# Patient Record
Sex: FEMALE | Race: WHITE | NOT HISPANIC OR LATINO
[De-identification: names, ages, dates, MRNs, and addresses within clinical notes are randomized per-mention and may not be internally consistent; named-entity substitution may affect disease eponyms.]

---

## 2017-02-06 ENCOUNTER — APPOINTMENT (OUTPATIENT)
Age: 57
End: 2017-02-06

## 2017-04-04 LAB
BASOPHILS # BLD AUTO: 0.03 K/UL
BASOPHILS NFR BLD AUTO: 0.5 %
EOSINOPHIL # BLD AUTO: 0.26 K/UL
EOSINOPHIL NFR BLD AUTO: 4.1 %
HCT VFR BLD CALC: 47.6 %
HGB BLD-MCNC: 15.8 G/DL
IMM GRANULOCYTES NFR BLD AUTO: 0.2 %
LYMPHOCYTES # BLD AUTO: 2.36 K/UL
LYMPHOCYTES NFR BLD AUTO: 37.2 %
MAN DIFF?: NORMAL
MCHC RBC-ENTMCNC: 33.2 GM/DL
MCHC RBC-ENTMCNC: 33.9 PG
MCV RBC AUTO: 102.1 FL
MONOCYTES # BLD AUTO: 0.63 K/UL
MONOCYTES NFR BLD AUTO: 9.9 %
NEUTROPHILS # BLD AUTO: 3.05 K/UL
NEUTROPHILS NFR BLD AUTO: 48.1 %
PLATELET # BLD AUTO: 135 K/UL
RBC # BLD: 4.66 M/UL
RBC # FLD: 13.2 %
WBC # FLD AUTO: 6.34 K/UL

## 2017-04-05 LAB
HCV RNA SERPL NAA DL=5-ACNC: ABNORMAL IU/ML
HCV RNA SERPL NAA+PROBE-LOG IU: 5.82 LOGIU/ML

## 2017-05-15 ENCOUNTER — EMERGENCY (EMERGENCY)
Facility: HOSPITAL | Age: 57
LOS: 1 days | Discharge: HOME | End: 2017-05-15
Attending: EMERGENCY MEDICINE | Admitting: EMERGENCY MEDICINE
Payer: MEDICAID

## 2017-05-15 VITALS
WEIGHT: 139.99 LBS | RESPIRATION RATE: 15 BRPM | DIASTOLIC BLOOD PRESSURE: 84 MMHG | TEMPERATURE: 99 F | OXYGEN SATURATION: 97 % | HEIGHT: 67 IN | HEART RATE: 78 BPM | SYSTOLIC BLOOD PRESSURE: 127 MMHG

## 2017-05-15 DIAGNOSIS — F17.210 NICOTINE DEPENDENCE, CIGARETTES, UNCOMPLICATED: ICD-10-CM
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
## 2017-05-15 DIAGNOSIS — S80.812A ABRASION, LEFT LOWER LEG, INITIAL ENCOUNTER: ICD-10-CM

## 2017-05-15 DIAGNOSIS — Z88.2 ALLERGY STATUS TO SULFONAMIDES: ICD-10-CM
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
## 2017-05-15 DIAGNOSIS — S09.92XA UNSPECIFIED INJURY OF NOSE, INITIAL ENCOUNTER: ICD-10-CM

## 2017-05-15 DIAGNOSIS — S00.33XA CONTUSION OF NOSE, INITIAL ENCOUNTER: ICD-10-CM

## 2017-05-15 PROCEDURE — 70160 X-RAY EXAM OF NASAL BONES: CPT

## 2017-05-15 PROCEDURE — 99283 EMERGENCY DEPT VISIT LOW MDM: CPT

## 2017-05-15 PROCEDURE — 99283 EMERGENCY DEPT VISIT LOW MDM: CPT | Mod: 25

## 2017-05-15 PROCEDURE — 70160 X-RAY EXAM OF NASAL BONES: CPT | Mod: 26

## 2017-05-15 NOTE — ED PROVIDER NOTE - ATTENDING CONTRIBUTION TO CARE
Pt is a 57 yo female who presents to the ED with a cc of nasal bone pain and possible fracture.  Pt reports that yesterday she was running to cross the street.  She did not see a low lying chain, tripped and fell striking her right hip and left side of her face on the ground.  Denies LOC.  Pt is not on blood thinners.  Reports Tetanus is UTD.  Pt states that she suffered and abrasion to her left shin from the chain and a bruise to her right hip but has been ambulatory without issue.  She is concerned that she may have fracture her nose because it is swollen.  Denies epistaxis.  On exam pt resting comfortably in bed NAD.  PERRL, EOMI, swelling and contusion noted to left side of nose with abrasion to distal aspect.  No active bleeding or septal hematoma noted.  Contusion noted to right lateral hip.  Bones intact no acute deformity seen.   FROM of leg.  Abrasion noted to left shin no acute deformity noted.  Agree with above plan of care.

## 2017-05-15 NOTE — ED ADULT NURSE REASSESSMENT NOTE - NS ED NURSE REASSESS COMMENT FT1
pt has  8 cm abrasion to  left ankle no bleeding at site .  abrasion to rt knee and rt elbow no bleeding wound closed. .  there is a purple hematoma rt upper thigh.  pt ambulating with steady gait.  reviewed d/c instruction ices to nose follow up with PCP x 24hr.

## 2017-05-15 NOTE — ED PROVIDER NOTE - OBJECTIVE STATEMENT
trip and fall yesterday on street, struck her nose, denies loc, denies nosebleed. PMD Dr Feliz  tetanus utd  refused pain med

## 2017-07-11 ENCOUNTER — APPOINTMENT (OUTPATIENT)
Age: 57
End: 2017-07-11

## 2017-07-14 ENCOUNTER — APPOINTMENT (OUTPATIENT)
Age: 57
End: 2017-07-14

## 2017-07-14 VITALS
TEMPERATURE: 98.3 F | BODY MASS INDEX: 21.97 KG/M2 | HEIGHT: 67 IN | WEIGHT: 140 LBS | RESPIRATION RATE: 12 BRPM | HEART RATE: 82 BPM | SYSTOLIC BLOOD PRESSURE: 130 MMHG | DIASTOLIC BLOOD PRESSURE: 83 MMHG

## 2017-07-14 DIAGNOSIS — R10.11 RIGHT UPPER QUADRANT PAIN: ICD-10-CM

## 2017-07-14 LAB
AFP-TM SERPL-MCNC: 5.1 NG/ML
ALBUMIN SERPL ELPH-MCNC: 4.6 G/DL
ALP BLD-CCNC: 88 U/L
ALT SERPL-CCNC: 86 U/L
AMYLASE/CREAT SERPL: 61 U/L
ANION GAP SERPL CALC-SCNC: 13 MMOL/L
AST SERPL-CCNC: 99 U/L
BASOPHILS # BLD AUTO: 0.04 K/UL
BASOPHILS NFR BLD AUTO: 0.7 %
BILIRUB SERPL-MCNC: 0.4 MG/DL
BUN SERPL-MCNC: 8 MG/DL
CALCIUM SERPL-MCNC: 9.5 MG/DL
CHLORIDE SERPL-SCNC: 101 MMOL/L
CO2 SERPL-SCNC: 25 MMOL/L
CREAT SERPL-MCNC: 0.74 MG/DL
EOSINOPHIL # BLD AUTO: 0.19 K/UL
EOSINOPHIL NFR BLD AUTO: 3.1 %
HCT VFR BLD CALC: 46.8 %
HGB BLD-MCNC: 15.5 G/DL
IMM GRANULOCYTES NFR BLD AUTO: 0 %
LPL SERPL-CCNC: 85 U/L
LYMPHOCYTES # BLD AUTO: 1.72 K/UL
LYMPHOCYTES NFR BLD AUTO: 28.5 %
MAN DIFF?: NORMAL
MCHC RBC-ENTMCNC: 32.6 PG
MCHC RBC-ENTMCNC: 33.1 GM/DL
MCV RBC AUTO: 98.3 FL
MONOCYTES # BLD AUTO: 0.88 K/UL
MONOCYTES NFR BLD AUTO: 14.6 %
NEUTROPHILS # BLD AUTO: 3.21 K/UL
NEUTROPHILS NFR BLD AUTO: 53.1 %
PLATELET # BLD AUTO: 202 K/UL
POTASSIUM SERPL-SCNC: 4.9 MMOL/L
PROT SERPL-MCNC: 7.9 G/DL
RBC # BLD: 4.76 M/UL
RBC # FLD: 13.3 %
SODIUM SERPL-SCNC: 139 MMOL/L
WBC # FLD AUTO: 6.04 K/UL

## 2017-07-14 RX ORDER — ESTROGENS, CONJUGATED 0.62 MG/1
0.62 TABLET, FILM COATED ORAL DAILY
Refills: 0 | Status: ACTIVE | COMMUNITY
Start: 2017-07-14

## 2017-07-17 LAB
HCV RNA SERPL NAA DL=5-ACNC: ABNORMAL IU/ML
HCV RNA SERPL NAA+PROBE-LOG IU: 5.66 LOGIU/ML

## 2017-07-19 ENCOUNTER — APPOINTMENT (OUTPATIENT)
Age: 57
End: 2017-07-19

## 2017-07-19 DIAGNOSIS — B18.2 CHRONIC VIRAL HEPATITIS C: ICD-10-CM

## 2017-07-24 RX ORDER — LEDIPASVIR AND SOFOSBUVIR 90; 400 MG/1; MG/1
90-400 TABLET, FILM COATED ORAL
Qty: 28 | Refills: 2 | Status: DISCONTINUED | COMMUNITY
Start: 2017-07-24 | End: 2017-07-24

## 2017-07-28 RX ORDER — ELBASVIR AND GRAZOPREVIR 50; 100 MG/1; MG/1
50-100 TABLET, FILM COATED ORAL
Qty: 28 | Refills: 2 | Status: ACTIVE | COMMUNITY
Start: 2017-07-24

## 2017-09-25 ENCOUNTER — EMERGENCY (EMERGENCY)
Facility: HOSPITAL | Age: 57
LOS: 1 days | Discharge: ANOTHER HOSPITAL | End: 2017-09-25
Attending: EMERGENCY MEDICINE | Admitting: EMERGENCY MEDICINE
Payer: MEDICAID

## 2017-09-25 ENCOUNTER — INPATIENT (INPATIENT)
Facility: HOSPITAL | Age: 57
LOS: 1 days | Discharge: HOME | DRG: 184 | End: 2017-09-27
Attending: SURGERY | Admitting: SURGERY
Payer: MEDICAID

## 2017-09-25 VITALS — DIASTOLIC BLOOD PRESSURE: 74 MMHG | SYSTOLIC BLOOD PRESSURE: 124 MMHG | HEART RATE: 60 BPM

## 2017-09-25 VITALS
HEART RATE: 114 BPM | SYSTOLIC BLOOD PRESSURE: 151 MMHG | OXYGEN SATURATION: 97 % | TEMPERATURE: 99 F | DIASTOLIC BLOOD PRESSURE: 90 MMHG | WEIGHT: 139.99 LBS | RESPIRATION RATE: 20 BRPM | HEIGHT: 67 IN

## 2017-09-25 VITALS
TEMPERATURE: 98 F | HEART RATE: 65 BPM | OXYGEN SATURATION: 97 % | DIASTOLIC BLOOD PRESSURE: 86 MMHG | RESPIRATION RATE: 18 BRPM | SYSTOLIC BLOOD PRESSURE: 145 MMHG

## 2017-09-25 DIAGNOSIS — F17.200 NICOTINE DEPENDENCE, UNSPECIFIED, UNCOMPLICATED: ICD-10-CM

## 2017-09-25 DIAGNOSIS — Z90.710 ACQUIRED ABSENCE OF BOTH CERVIX AND UTERUS: Chronic | ICD-10-CM

## 2017-09-25 DIAGNOSIS — S22.39XA FRACTURE OF ONE RIB, UNSPECIFIED SIDE, INITIAL ENCOUNTER FOR CLOSED FRACTURE: ICD-10-CM

## 2017-09-25 DIAGNOSIS — Z88.2 ALLERGY STATUS TO SULFONAMIDES: ICD-10-CM

## 2017-09-25 DIAGNOSIS — S22.42XA MULTIPLE FRACTURES OF RIBS, LEFT SIDE, INITIAL ENCOUNTER FOR CLOSED FRACTURE: ICD-10-CM

## 2017-09-25 DIAGNOSIS — Z88.6 ALLERGY STATUS TO ANALGESIC AGENT: ICD-10-CM

## 2017-09-25 DIAGNOSIS — S27.0XXA TRAUMATIC PNEUMOTHORAX, INITIAL ENCOUNTER: ICD-10-CM
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
## 2017-09-25 LAB
ALBUMIN SERPL ELPH-MCNC: 3.9 G/DL — SIGNIFICANT CHANGE UP (ref 3.3–5)
ALBUMIN SERPL ELPH-MCNC: 4 G/DL — SIGNIFICANT CHANGE UP (ref 3.3–5)
ALP SERPL-CCNC: 55 U/L — SIGNIFICANT CHANGE UP (ref 40–120)
ALP SERPL-CCNC: 77 U/L — SIGNIFICANT CHANGE UP (ref 40–120)
ALT FLD-CCNC: 24 U/L RC — SIGNIFICANT CHANGE UP (ref 10–45)
ALT FLD-CCNC: 35 U/L — SIGNIFICANT CHANGE UP (ref 12–78)
ANION GAP SERPL CALC-SCNC: 10 MMOL/L — SIGNIFICANT CHANGE UP (ref 5–17)
ANION GAP SERPL CALC-SCNC: 12 MMOL/L — SIGNIFICANT CHANGE UP (ref 5–17)
APTT BLD: 27.3 SEC — LOW (ref 27.5–37.4)
APTT BLD: 29 SEC — SIGNIFICANT CHANGE UP (ref 27.5–37.4)
AST SERPL-CCNC: 34 U/L — SIGNIFICANT CHANGE UP (ref 10–40)
AST SERPL-CCNC: 40 U/L — HIGH (ref 15–37)
BASOPHILS # BLD AUTO: 0.2 K/UL — SIGNIFICANT CHANGE UP (ref 0–0.2)
BASOPHILS NFR BLD AUTO: 1.3 % — SIGNIFICANT CHANGE UP (ref 0–2)
BILIRUB DIRECT SERPL-MCNC: 0.2 MG/DL — SIGNIFICANT CHANGE UP (ref 0–0.2)
BILIRUB INDIRECT FLD-MCNC: 0.3 MG/DL — SIGNIFICANT CHANGE UP (ref 0.2–1)
BILIRUB SERPL-MCNC: 0.4 MG/DL — SIGNIFICANT CHANGE UP (ref 0.2–1.2)
BILIRUB SERPL-MCNC: 0.5 MG/DL — SIGNIFICANT CHANGE UP (ref 0.2–1.2)
BLD GP AB SCN SERPL QL: NEGATIVE — SIGNIFICANT CHANGE UP
BUN SERPL-MCNC: 7 MG/DL — SIGNIFICANT CHANGE UP (ref 7–23)
BUN SERPL-MCNC: 7 MG/DL — SIGNIFICANT CHANGE UP (ref 7–23)
CALCIUM SERPL-MCNC: 8.2 MG/DL — LOW (ref 8.4–10.5)
CALCIUM SERPL-MCNC: 8.6 MG/DL — SIGNIFICANT CHANGE UP (ref 8.5–10.1)
CHLORIDE SERPL-SCNC: 106 MMOL/L — SIGNIFICANT CHANGE UP (ref 96–108)
CHLORIDE SERPL-SCNC: 106 MMOL/L — SIGNIFICANT CHANGE UP (ref 96–108)
CO2 SERPL-SCNC: 24 MMOL/L — SIGNIFICANT CHANGE UP (ref 22–31)
CO2 SERPL-SCNC: 24 MMOL/L — SIGNIFICANT CHANGE UP (ref 22–31)
CREAT SERPL-MCNC: 0.51 MG/DL — SIGNIFICANT CHANGE UP (ref 0.5–1.3)
CREAT SERPL-MCNC: 0.53 MG/DL — SIGNIFICANT CHANGE UP (ref 0.5–1.3)
EOSINOPHIL # BLD AUTO: 0.3 K/UL — SIGNIFICANT CHANGE UP (ref 0–0.5)
EOSINOPHIL NFR BLD AUTO: 2.3 % — SIGNIFICANT CHANGE UP (ref 0–6)
GLUCOSE SERPL-MCNC: 85 MG/DL — SIGNIFICANT CHANGE UP (ref 70–99)
GLUCOSE SERPL-MCNC: 87 MG/DL — SIGNIFICANT CHANGE UP (ref 70–99)
HCT VFR BLD CALC: 40.1 % — SIGNIFICANT CHANGE UP (ref 34.5–45)
HCT VFR BLD CALC: 44.1 % — SIGNIFICANT CHANGE UP (ref 34.5–45)
HGB BLD-MCNC: 13.9 G/DL — SIGNIFICANT CHANGE UP (ref 11.5–15.5)
HGB BLD-MCNC: 16 G/DL — HIGH (ref 11.5–15.5)
INR BLD: 1.01 RATIO — SIGNIFICANT CHANGE UP (ref 0.88–1.16)
INR BLD: 1.03 RATIO — SIGNIFICANT CHANGE UP (ref 0.88–1.16)
LYMPHOCYTES # BLD AUTO: 27.2 % — SIGNIFICANT CHANGE UP (ref 13–44)
LYMPHOCYTES # BLD AUTO: 3.6 K/UL — HIGH (ref 1–3.3)
MCHC RBC-ENTMCNC: 34.7 GM/DL — SIGNIFICANT CHANGE UP (ref 32–36)
MCHC RBC-ENTMCNC: 35.1 PG — HIGH (ref 27–34)
MCHC RBC-ENTMCNC: 35.3 PG — HIGH (ref 27–34)
MCHC RBC-ENTMCNC: 36.3 GM/DL — HIGH (ref 32–36)
MCV RBC AUTO: 101 FL — HIGH (ref 80–100)
MCV RBC AUTO: 97.2 FL — SIGNIFICANT CHANGE UP (ref 80–100)
MONOCYTES # BLD AUTO: 0.8 K/UL — SIGNIFICANT CHANGE UP (ref 0–0.9)
MONOCYTES NFR BLD AUTO: 6 % — SIGNIFICANT CHANGE UP (ref 1–9)
NEUTROPHILS # BLD AUTO: 8.4 K/UL — HIGH (ref 1.8–7.4)
NEUTROPHILS NFR BLD AUTO: 63.3 % — SIGNIFICANT CHANGE UP (ref 43–77)
PLATELET # BLD AUTO: 147 K/UL — LOW (ref 150–400)
PLATELET # BLD AUTO: 202 K/UL — SIGNIFICANT CHANGE UP (ref 150–400)
POTASSIUM SERPL-MCNC: 3.8 MMOL/L — SIGNIFICANT CHANGE UP (ref 3.5–5.3)
POTASSIUM SERPL-MCNC: 4.2 MMOL/L — SIGNIFICANT CHANGE UP (ref 3.5–5.3)
POTASSIUM SERPL-SCNC: 3.8 MMOL/L — SIGNIFICANT CHANGE UP (ref 3.5–5.3)
POTASSIUM SERPL-SCNC: 4.2 MMOL/L — SIGNIFICANT CHANGE UP (ref 3.5–5.3)
PROT SERPL-MCNC: 6.8 G/DL — SIGNIFICANT CHANGE UP (ref 6–8.3)
PROT SERPL-MCNC: 8.2 G/DL — SIGNIFICANT CHANGE UP (ref 6–8.3)
PROTHROM AB SERPL-ACNC: 11 SEC — SIGNIFICANT CHANGE UP (ref 9.8–12.7)
PROTHROM AB SERPL-ACNC: 11.1 SEC — SIGNIFICANT CHANGE UP (ref 9.8–12.7)
RBC # BLD: 3.96 M/UL — SIGNIFICANT CHANGE UP (ref 3.8–5.2)
RBC # BLD: 4.54 M/UL — SIGNIFICANT CHANGE UP (ref 3.8–5.2)
RBC # FLD: 10.7 % — SIGNIFICANT CHANGE UP (ref 10.3–14.5)
RBC # FLD: 10.9 % — SIGNIFICANT CHANGE UP (ref 10.3–14.5)
RH IG SCN BLD-IMP: POSITIVE — SIGNIFICANT CHANGE UP
RH IG SCN BLD-IMP: POSITIVE — SIGNIFICANT CHANGE UP
SODIUM SERPL-SCNC: 140 MMOL/L — SIGNIFICANT CHANGE UP (ref 135–145)
SODIUM SERPL-SCNC: 142 MMOL/L — SIGNIFICANT CHANGE UP (ref 135–145)
WBC # BLD: 13.3 K/UL — HIGH (ref 3.8–10.5)
WBC # BLD: 8.8 K/UL — SIGNIFICANT CHANGE UP (ref 3.8–10.5)
WBC # FLD AUTO: 13.3 K/UL — HIGH (ref 3.8–10.5)
WBC # FLD AUTO: 8.8 K/UL — SIGNIFICANT CHANGE UP (ref 3.8–10.5)

## 2017-09-25 PROCEDURE — 85610 PROTHROMBIN TIME: CPT

## 2017-09-25 PROCEDURE — 96374 THER/PROPH/DIAG INJ IV PUSH: CPT

## 2017-09-25 PROCEDURE — 99285 EMERGENCY DEPT VISIT HI MDM: CPT | Mod: 25

## 2017-09-25 PROCEDURE — 85730 THROMBOPLASTIN TIME PARTIAL: CPT

## 2017-09-25 PROCEDURE — 71010: CPT | Mod: 26

## 2017-09-25 PROCEDURE — 71250 CT THORAX DX C-: CPT | Mod: 26

## 2017-09-25 PROCEDURE — 71045 X-RAY EXAM CHEST 1 VIEW: CPT

## 2017-09-25 PROCEDURE — 71250 CT THORAX DX C-: CPT

## 2017-09-25 PROCEDURE — 85027 COMPLETE CBC AUTOMATED: CPT

## 2017-09-25 PROCEDURE — 96375 TX/PRO/DX INJ NEW DRUG ADDON: CPT

## 2017-09-25 PROCEDURE — 93005 ELECTROCARDIOGRAM TRACING: CPT

## 2017-09-25 PROCEDURE — 80053 COMPREHEN METABOLIC PANEL: CPT

## 2017-09-25 PROCEDURE — 96361 HYDRATE IV INFUSION ADD-ON: CPT

## 2017-09-25 RX ORDER — OXYCODONE AND ACETAMINOPHEN 5; 325 MG/1; MG/1
2 TABLET ORAL ONCE
Qty: 0 | Refills: 0 | Status: DISCONTINUED | OUTPATIENT
Start: 2017-09-25 | End: 2017-09-25

## 2017-09-25 RX ORDER — GABAPENTIN 400 MG/1
300 CAPSULE ORAL EVERY 8 HOURS
Qty: 0 | Refills: 0 | Status: DISCONTINUED | OUTPATIENT
Start: 2017-09-25 | End: 2017-09-27

## 2017-09-25 RX ORDER — ESTROGENS, CONJUGATED 0.625 MG
0.62 TABLET ORAL DAILY
Qty: 0 | Refills: 0 | Status: DISCONTINUED | OUTPATIENT
Start: 2017-09-25 | End: 2017-09-27

## 2017-09-25 RX ORDER — LIDOCAINE 4 G/100G
1 CREAM TOPICAL DAILY
Qty: 0 | Refills: 0 | Status: DISCONTINUED | OUTPATIENT
Start: 2017-09-25 | End: 2017-09-27

## 2017-09-25 RX ORDER — ACETAMINOPHEN 500 MG
650 TABLET ORAL EVERY 6 HOURS
Qty: 0 | Refills: 0 | Status: DISCONTINUED | OUTPATIENT
Start: 2017-09-25 | End: 2017-09-27

## 2017-09-25 RX ORDER — METHADONE HYDROCHLORIDE 40 MG/1
42 TABLET ORAL EVERY 24 HOURS
Qty: 0 | Refills: 0 | Status: DISCONTINUED | OUTPATIENT
Start: 2017-09-25 | End: 2017-09-27

## 2017-09-25 RX ORDER — SODIUM CHLORIDE 9 MG/ML
3 INJECTION INTRAMUSCULAR; INTRAVENOUS; SUBCUTANEOUS ONCE
Qty: 0 | Refills: 0 | Status: COMPLETED | OUTPATIENT
Start: 2017-09-25 | End: 2017-09-25

## 2017-09-25 RX ORDER — OXYCODONE AND ACETAMINOPHEN 5; 325 MG/1; MG/1
1 TABLET ORAL ONCE
Qty: 0 | Refills: 0 | Status: DISCONTINUED | OUTPATIENT
Start: 2017-09-25 | End: 2017-09-25

## 2017-09-25 RX ORDER — ONDANSETRON 8 MG/1
4 TABLET, FILM COATED ORAL ONCE
Qty: 0 | Refills: 0 | Status: COMPLETED | OUTPATIENT
Start: 2017-09-25 | End: 2017-09-25

## 2017-09-25 RX ORDER — MORPHINE SULFATE 50 MG/1
4 CAPSULE, EXTENDED RELEASE ORAL ONCE
Qty: 0 | Refills: 0 | Status: DISCONTINUED | OUTPATIENT
Start: 2017-09-25 | End: 2017-09-25

## 2017-09-25 RX ORDER — HYDROMORPHONE HYDROCHLORIDE 2 MG/ML
1 INJECTION INTRAMUSCULAR; INTRAVENOUS; SUBCUTANEOUS ONCE
Qty: 0 | Refills: 0 | Status: DISCONTINUED | OUTPATIENT
Start: 2017-09-25 | End: 2017-09-25

## 2017-09-25 RX ORDER — OXYCODONE HYDROCHLORIDE 5 MG/1
10 TABLET ORAL EVERY 4 HOURS
Qty: 0 | Refills: 0 | Status: DISCONTINUED | OUTPATIENT
Start: 2017-09-25 | End: 2017-09-27

## 2017-09-25 RX ORDER — OXYCODONE HYDROCHLORIDE 5 MG/1
5 TABLET ORAL EVERY 4 HOURS
Qty: 0 | Refills: 0 | Status: DISCONTINUED | OUTPATIENT
Start: 2017-09-25 | End: 2017-09-27

## 2017-09-25 RX ORDER — ENOXAPARIN SODIUM 100 MG/ML
40 INJECTION SUBCUTANEOUS DAILY
Qty: 0 | Refills: 0 | Status: DISCONTINUED | OUTPATIENT
Start: 2017-09-25 | End: 2017-09-27

## 2017-09-25 RX ORDER — SODIUM CHLORIDE 9 MG/ML
1000 INJECTION INTRAMUSCULAR; INTRAVENOUS; SUBCUTANEOUS
Qty: 0 | Refills: 0 | Status: DISCONTINUED | OUTPATIENT
Start: 2017-09-25 | End: 2017-09-29

## 2017-09-25 RX ORDER — METHADONE HYDROCHLORIDE 40 MG/1
42 TABLET ORAL DAILY
Qty: 0 | Refills: 0 | Status: DISCONTINUED | OUTPATIENT
Start: 2017-09-25 | End: 2017-09-25

## 2017-09-25 RX ADMIN — GABAPENTIN 300 MILLIGRAM(S): 400 CAPSULE ORAL at 14:10

## 2017-09-25 RX ADMIN — OXYCODONE HYDROCHLORIDE 10 MILLIGRAM(S): 5 TABLET ORAL at 11:11

## 2017-09-25 RX ADMIN — MORPHINE SULFATE 4 MILLIGRAM(S): 50 CAPSULE, EXTENDED RELEASE ORAL at 04:01

## 2017-09-25 RX ADMIN — HYDROMORPHONE HYDROCHLORIDE 1 MILLIGRAM(S): 2 INJECTION INTRAMUSCULAR; INTRAVENOUS; SUBCUTANEOUS at 06:05

## 2017-09-25 RX ADMIN — OXYCODONE HYDROCHLORIDE 10 MILLIGRAM(S): 5 TABLET ORAL at 23:19

## 2017-09-25 RX ADMIN — SODIUM CHLORIDE 3 MILLILITER(S): 9 INJECTION INTRAMUSCULAR; INTRAVENOUS; SUBCUTANEOUS at 04:05

## 2017-09-25 RX ADMIN — SODIUM CHLORIDE 1000 MILLILITER(S): 9 INJECTION INTRAMUSCULAR; INTRAVENOUS; SUBCUTANEOUS at 04:04

## 2017-09-25 RX ADMIN — HYDROMORPHONE HYDROCHLORIDE 1 MILLIGRAM(S): 2 INJECTION INTRAMUSCULAR; INTRAVENOUS; SUBCUTANEOUS at 07:05

## 2017-09-25 RX ADMIN — OXYCODONE HYDROCHLORIDE 10 MILLIGRAM(S): 5 TABLET ORAL at 18:59

## 2017-09-25 RX ADMIN — LIDOCAINE 1 PATCH: 4 CREAM TOPICAL at 17:32

## 2017-09-25 RX ADMIN — ONDANSETRON 4 MILLIGRAM(S): 8 TABLET, FILM COATED ORAL at 04:00

## 2017-09-25 RX ADMIN — OXYCODONE HYDROCHLORIDE 10 MILLIGRAM(S): 5 TABLET ORAL at 11:38

## 2017-09-25 RX ADMIN — OXYCODONE AND ACETAMINOPHEN 2 TABLET(S): 5; 325 TABLET ORAL at 01:16

## 2017-09-25 RX ADMIN — HYDROMORPHONE HYDROCHLORIDE 1 MILLIGRAM(S): 2 INJECTION INTRAMUSCULAR; INTRAVENOUS; SUBCUTANEOUS at 08:12

## 2017-09-25 RX ADMIN — OXYCODONE HYDROCHLORIDE 10 MILLIGRAM(S): 5 TABLET ORAL at 16:00

## 2017-09-25 RX ADMIN — OXYCODONE AND ACETAMINOPHEN 2 TABLET(S): 5; 325 TABLET ORAL at 01:45

## 2017-09-25 RX ADMIN — OXYCODONE HYDROCHLORIDE 10 MILLIGRAM(S): 5 TABLET ORAL at 15:22

## 2017-09-25 RX ADMIN — METHADONE HYDROCHLORIDE 42 MILLIGRAM(S): 40 TABLET ORAL at 18:17

## 2017-09-25 RX ADMIN — OXYCODONE HYDROCHLORIDE 10 MILLIGRAM(S): 5 TABLET ORAL at 19:54

## 2017-09-25 RX ADMIN — Medication 0.62 MILLIGRAM(S): at 14:10

## 2017-09-25 RX ADMIN — GABAPENTIN 300 MILLIGRAM(S): 400 CAPSULE ORAL at 21:36

## 2017-09-25 NOTE — ED PROVIDER NOTE - OBJECTIVE STATEMENT
57 yo F p/w slip and fall in shower tonight. Pt hit L upper / mid post ribs on side of tub. no head inj / loc. No neck / back pain. no abd pain> no n/v/d. No weak / dizzy / malaise. No fever/chills. No dyspnea. Inc pain with certain movements, or deep insp. NO numb/ting/focal weak. no agg/allev factors. No other inj or co.

## 2017-09-25 NOTE — ED PROVIDER NOTE - CHPI ED SYMPTOMS NEG
no abrasion/no deformity/no vomiting/no fever/no loss of consciousness/no tingling/no bleeding/no weakness/no numbness/no confusion

## 2017-09-25 NOTE — ED PROVIDER NOTE - MUSCULOSKELETAL, MLM
Spine appears normal, no spinal tend (c,t,l),.  range of motion is not limited, no muscle or joint tenderness otherwise noted

## 2017-09-25 NOTE — H&P ADULT - NSHPSOCIALHISTORY_GEN_ALL_CORE
1/2 pack per day smoker for past 20-25 years  Social EtOH use  Denies recreational drug use  Unemployed  Lives at home with

## 2017-09-25 NOTE — H&P ADULT - NSHPLABSRESULTS_GEN_ALL_CORE
Labs from [facility] ED reviewed, no significant abnormality noted  CT Chest from [facility] ED reviewed, sig for L 6-9 posterior and L 6 anterior rib fractures w/ trace L pneumothorax.  CXR in [facility] ED repeated, grossly normal study

## 2017-09-25 NOTE — H&P ADULT - ASSESSMENT
56yF s/p mechanical fall in bathtub now w/ L 6-9 rib fractures and trace PTX    - Admit to Trauma surgery service under Dr. Nelson  - Pain control w/ standing Tylenol, Gabapentin, and Lidoderm patch, PRN Oxycodone  - OOB, Ambulate, IS (able to pull 1000cc in ED)  - Regular diet  - VTE ppx w/ Lovenox  - Monitor respiratory status  - No indication for chest tube given small and stable size of PTX  - Will resume home Premarin.  is bringing home dose fo Zapatier for HCV (2 weeks left in course)  - Please page [phone] with any questions  - Plan discussed with Dr. Leslie Shelton, PGY-2 56yF s/p mechanical fall in bathtub now w/ L 6-9 rib fractures and trace PTX    - Admit to Trauma surgery service under Dr. Nelson  - Pain control w/ standing Tylenol, Gabapentin, and Lidoderm patch, PRN Oxycodone  - OOB, Ambulate, IS (able to pull 1000cc in ED)  - Regular diet  - VTE ppx w/ Lovenox  - Monitor respiratory status  - No indication for chest tube given small and stable size of PTX  - Will resume home Premarin.  is bringing home dose fo Zapatier for HCV (2 weeks left in course)  - Noted to have small pulmonary nodules on Chest CT, will need repeat Chest CT in 12 months as patient is high risk given smoking history  - Please page [phone] with any questions  - Plan discussed with Dr. Leslie Shelton, PGY-2

## 2017-09-25 NOTE — CHART NOTE - NSCHARTNOTEFT_GEN_A_CORE
Spoke with  Melina Sam from [facility] Methadone clinic program and confirmed methadone dose is 42mg Qdaily prescribed by Dr. Harvey.     Zuleyka Anders PA-C  [id number] Spoke with  Melina Sam from [facility] Methadone clinic program and confirmed methadone dose is 42mg solution Qdaily prescribed by Dr. Harvey.   telephone # [phone]  Zuleyka Anders PA-C  [id number]

## 2017-09-25 NOTE — ED ADULT NURSE NOTE - OBJECTIVE STATEMENT
55 y/o F presents to the ED via EMS s/p transfer from [place] [facility] for fall.  Pt states she slipped and fell in the bath tub around 2300.  Pt states she slipped and hit her left side against the ledge of the tub.  Pt states she had a CT and x rays done at [facility] and was told she had left sided rib fractures 6-9 and small pneumothorax of the L sided.  EMS states the pt had a anterior and posterior fracture to 6th rib.  PT denies hitting head.  Pt presents with 20 G R forearm from [facility].  EMS states the pt received 2 percocet tablets, 4mg morpho ine, 4 Zofran and 1 L NS.  VSS.  Pt currently c/o pain when talking a deep breathe or talking.  Pt ambulatory in ED.  Patient is A&Ox4. Face is symmetrical. PERRL 3mmB.

## 2017-09-25 NOTE — H&P ADULT - HISTORY OF PRESENT ILLNESS
56yF w/ PMH sig for HCV on Zepatier, s/p hysterectomy approximately 25 yrs ago. Pt slipped and fell in bathtub at approximately 2100 on 9/27/17. She landed on the left side of her chest. She states she felt like the wind was knocked out of her, but she was able to breath normally shortly after the fall. She experienced sharp stabbing pain in the left chest immediately after the fall that has improved slightly. She initially presented to [facility] on the morning of 9/24. A CT chest at [facility] ED was sig for L 6-9 rib fractures w/ trace PTX. She was transferred to [facility] for further evaluation and management. Pt deneis LOC, HA, weakness, numbness, tingling, N/V, and neck, extremity, abdominal, or pelvic pain. She states it is difficult to take a deep breath secondary to pain, but denies SoB. She also states it is difficult to cough secondary to pain. 56yF w/ PMH sig for HCV on Zepatier, s/p hysterectomy approximately 25 yrs ago. Pt slipped and fell in bathtub at approximately 2100 on 9/24/17. She landed on the left side of her chest. She states she felt like the wind was knocked out of her, but she was able to breath normally shortly after the fall. She experienced sharp stabbing pain in the left chest immediately after the fall that has improved slightly. She initially presented to [facility] on the morning of 9/24. A CT chest at [facility] ED was sig for L 6-9 rib fractures w/ trace PTX. She was transferred to [facility] for further evaluation and management. Pt deneis LOC, HA, weakness, numbness, tingling, N/V, and neck, extremity, abdominal, or pelvic pain. She states it is difficult to take a deep breath secondary to pain, but denies SoB. She also states it is difficult to cough secondary to pain. 56yF w/ PMH sig for HCV on Zepatier, s/p hysterectomy approximately 25 yrs ago. Pt slipped and fell in bathtub at approximately 2100 on 9/24/17. She landed on the left side of her chest. She states she felt like the wind was knocked out of her, but she was able to breath normally shortly after the fall. She experienced sharp stabbing pain in the left chest immediately after the fall that has improved slightly. She initially presented to [facility] that night. A CT chest at [facility] ED was sig for L 6-9 rib fractures w/ trace PTX. She was transferred to [facility] for further evaluation and management. Pt deneis LOC, HA, weakness, numbness, tingling, N/V, and neck, extremity, abdominal, or pelvic pain. She states it is difficult to take a deep breath secondary to pain, but denies SoB. She also states it is difficult to cough secondary to pain.

## 2017-09-25 NOTE — ED PROVIDER NOTE - ENMT, MLM
Airway patent, Nasal mucosa clear. Mouth with normal mucosa. Throat has no vesicles, no oropharyngeal exudates and uvula is midline. no ext signs of head trauma.

## 2017-09-25 NOTE — ED PROVIDER NOTE - RESPIRATORY, MLM
Breath sounds clear and equal bilaterally. Pos L post mid / upper rib tenderness. ?> min crepitus. No flail.

## 2017-09-25 NOTE — ED PROVIDER NOTE - GASTROINTESTINAL, MLM
Abdomen soft, non-tender, no guarding. non-distended. No LUQ / other abd tenderness. NO signs of abd trauma.

## 2017-09-25 NOTE — H&P ADULT - NSHPPHYSICALEXAM_GEN_ALL_CORE
Vital Signs Last 24 Hrs  T(C): 36.7 (25 Sep 2017 06:09), Max: 36.7 (25 Sep 2017 06:09)  T(F): 98.1 (25 Sep 2017 06:09), Max: 98.1 (25 Sep 2017 06:09)  HR: 62 (25 Sep 2017 07:05) (60 - 80)  BP: 132/88 (25 Sep 2017 07:05) (124/74 - 132/88)  BP(mean): --  ABP: --  ABP(mean): --  RR: 16 (25 Sep 2017 07:05) (16 - 20)  SpO2: 96% (25 Sep 2017 07:05) (96% - 97%)    Gen: WD, WN, in some distress  HEENT: NC/AT, PERRLA, EOMI, Oropharynx clear  Neck: Supple, no JVD/Bruit, No thyromegaly  Chest: TTP L posterior and lateral chest wall, no crepitus or collections  Lungs: CTA B/L, no w/r/r, no accessory muscle use, able to pull 1000 cc on IS  Heart: RRR, S1 S2, No m/r/g  Abd: Soft, ND, NT, No HSM, No rebound or guarding  Ext: WWP. No clubbing, cyanosis, or edema, FROM  Neuro: AAOx3. CN II-XII grossly intact, No focal deficits

## 2017-09-25 NOTE — ED PROVIDER NOTE - MEDICAL DECISION MAKING DETAILS
Attending MD Suarez: 56F transferred from OSH for multiple traumatic left sided rib fx's after fall from standing and small PTX on left. No head injury, Cervical spine cleared clinically of fracture without need for imaging according to [facility] Criteria. Plan for trauma surgery consult, admission for pain control and incentive spirometry.

## 2017-09-25 NOTE — ED ADULT NURSE REASSESSMENT NOTE - NS ED NURSE REASSESS COMMENT FT1
Pt received from AROLDO Doll in CC, alert and oriented times three, breathing spontaneous, unlabored without distress on room air, NAD, resting, report 9/10 rib pain, will notified MD for pain meds, awaits dispo

## 2017-09-25 NOTE — ED PROVIDER NOTE - PHYSICAL EXAMINATION
Attending MD Suarez: A & O x 3, appears uncomfortable, EOMI b/l, PERRL b/l; lungs CTAB, diffuse ttp left lateral chest wall, spine nontender, heart with reg rhythm without murmur; abdomen soft NTND; extremities with no edema; affect appropriate. neuro exam non focal with no motor or sensory deficits.

## 2017-09-25 NOTE — ED PROVIDER NOTE - PROGRESS NOTE DETAILS
Pt doing well, no acute co or changes. [facility] [place] xfer center. Dr Pratt (trauma) accepts xfer. Dr Suarez ED - accepts xfer

## 2017-09-25 NOTE — H&P ADULT - PMH
Closed fracture of multiple ribs of left side, initial encounter    Hepatitis C virus infection, unspecified chronicity

## 2017-09-25 NOTE — ED PROVIDER NOTE - OBJECTIVE STATEMENT
56F PMH Hepatitis C (receiving treatment) p/w fall.  She was conditioning her hair and slipped in the bathtub, landing hard on her left side.  She had no LOC but was in too much pain to call out for several minutes.  Eventually called for her  who brought her to [facility], where she was found to have fractures in L ribs 6-9 posteriorly and 6 anteriorly with a trace pneumothorax.  Patient was transferred to [facility] for trauma evaluation.  On arrival she was complaining of mild L sided pain after morphine and oxycodone.  She denies headache, numbness/weakness, shortness of breath, does have pain with deep breathing and coughing.

## 2017-09-25 NOTE — ED PROVIDER NOTE - ATTENDING CONTRIBUTION TO CARE
Attending MD Suarez:  I personally have seen and examined this patient.  Resident note reviewed and agree on plan of care and except where noted.  See HPI, PE, and MDM for details.

## 2017-09-25 NOTE — ED PROVIDER NOTE - CARE PLAN
Principal Discharge DX:	Fracture of multiple ribs of left side, initial encounter  Secondary Diagnosis:	Traumatic pneumothorax, initial encounter

## 2017-09-25 NOTE — ED PROVIDER NOTE - PROGRESS NOTE DETAILS
bellis: per surgery admit to [facility]. on assessment pt in no acute distress but still c/o pain will give additional analgesia.

## 2017-09-26 PROCEDURE — 71010: CPT | Mod: 26

## 2017-09-26 PROCEDURE — 99232 SBSQ HOSP IP/OBS MODERATE 35: CPT

## 2017-09-26 RX ADMIN — OXYCODONE HYDROCHLORIDE 10 MILLIGRAM(S): 5 TABLET ORAL at 14:49

## 2017-09-26 RX ADMIN — OXYCODONE HYDROCHLORIDE 10 MILLIGRAM(S): 5 TABLET ORAL at 05:16

## 2017-09-26 RX ADMIN — METHADONE HYDROCHLORIDE 42 MILLIGRAM(S): 40 TABLET ORAL at 18:42

## 2017-09-26 RX ADMIN — LIDOCAINE 1 PATCH: 4 CREAM TOPICAL at 12:21

## 2017-09-26 RX ADMIN — GABAPENTIN 300 MILLIGRAM(S): 400 CAPSULE ORAL at 05:14

## 2017-09-26 RX ADMIN — GABAPENTIN 300 MILLIGRAM(S): 400 CAPSULE ORAL at 14:36

## 2017-09-26 RX ADMIN — OXYCODONE HYDROCHLORIDE 10 MILLIGRAM(S): 5 TABLET ORAL at 19:32

## 2017-09-26 RX ADMIN — OXYCODONE HYDROCHLORIDE 10 MILLIGRAM(S): 5 TABLET ORAL at 04:04

## 2017-09-26 RX ADMIN — GABAPENTIN 300 MILLIGRAM(S): 400 CAPSULE ORAL at 21:15

## 2017-09-26 RX ADMIN — OXYCODONE HYDROCHLORIDE 10 MILLIGRAM(S): 5 TABLET ORAL at 19:06

## 2017-09-26 RX ADMIN — Medication 0.62 MILLIGRAM(S): at 12:21

## 2017-09-26 RX ADMIN — OXYCODONE HYDROCHLORIDE 10 MILLIGRAM(S): 5 TABLET ORAL at 10:48

## 2017-09-26 RX ADMIN — LIDOCAINE 1 PATCH: 4 CREAM TOPICAL at 05:17

## 2017-09-26 RX ADMIN — OXYCODONE HYDROCHLORIDE 10 MILLIGRAM(S): 5 TABLET ORAL at 11:18

## 2017-09-26 RX ADMIN — OXYCODONE HYDROCHLORIDE 10 MILLIGRAM(S): 5 TABLET ORAL at 00:10

## 2017-09-26 RX ADMIN — OXYCODONE HYDROCHLORIDE 10 MILLIGRAM(S): 5 TABLET ORAL at 15:19

## 2017-09-26 NOTE — PROGRESS NOTE ADULT - ATTENDING COMMENTS
Pt seen and examined.  Chart reviewed.  Resident note confirmed.  Pt is 56 year old female with a medical history significant for Hep C who presented s/p mechanical fall in bathtub.  Pt sustained left 6-9 rib fractures and trace PTX. Pain is better controlled.  No acute events overnight.    A/p  - Traumatic pain, continue Pain control regimen w/ standing Tylenol, Gabapentin, and Lidoderm patch, PRN Oxycodone  - H/o narcotic dependency.  Cont Methadone  - OOB, Ambulate, IS (able to pull 1000cc in ED)  - Regular diet  - VTE ppx w/ Lovenox  - D/c planning

## 2017-09-27 ENCOUNTER — TRANSCRIPTION ENCOUNTER (OUTPATIENT)
Age: 57
End: 2017-09-27

## 2017-09-27 VITALS
SYSTOLIC BLOOD PRESSURE: 120 MMHG | DIASTOLIC BLOOD PRESSURE: 82 MMHG | OXYGEN SATURATION: 95 % | RESPIRATION RATE: 18 BRPM | HEART RATE: 76 BPM | TEMPERATURE: 99 F

## 2017-09-27 PROCEDURE — 71045 X-RAY EXAM CHEST 1 VIEW: CPT

## 2017-09-27 PROCEDURE — 97161 PT EVAL LOW COMPLEX 20 MIN: CPT

## 2017-09-27 PROCEDURE — 99285 EMERGENCY DEPT VISIT HI MDM: CPT | Mod: 25

## 2017-09-27 PROCEDURE — 85610 PROTHROMBIN TIME: CPT

## 2017-09-27 PROCEDURE — 85730 THROMBOPLASTIN TIME PARTIAL: CPT

## 2017-09-27 PROCEDURE — 85027 COMPLETE CBC AUTOMATED: CPT

## 2017-09-27 PROCEDURE — 96374 THER/PROPH/DIAG INJ IV PUSH: CPT

## 2017-09-27 PROCEDURE — 80076 HEPATIC FUNCTION PANEL: CPT

## 2017-09-27 PROCEDURE — 80048 BASIC METABOLIC PNL TOTAL CA: CPT

## 2017-09-27 PROCEDURE — 86850 RBC ANTIBODY SCREEN: CPT

## 2017-09-27 PROCEDURE — 86900 BLOOD TYPING SEROLOGIC ABO: CPT

## 2017-09-27 PROCEDURE — 86901 BLOOD TYPING SEROLOGIC RH(D): CPT

## 2017-09-27 RX ORDER — LIDOCAINE 4 G/100G
1 CREAM TOPICAL
Qty: 7 | Refills: 0 | OUTPATIENT
Start: 2017-09-27 | End: 2017-10-04

## 2017-09-27 RX ORDER — ACETAMINOPHEN 500 MG
2 TABLET ORAL
Qty: 0 | Refills: 0 | COMMUNITY
Start: 2017-09-27

## 2017-09-27 RX ORDER — OXYCODONE HYDROCHLORIDE 5 MG/1
1 TABLET ORAL
Qty: 16 | Refills: 0 | OUTPATIENT
Start: 2017-09-27 | End: 2017-10-01

## 2017-09-27 RX ORDER — METHADONE HYDROCHLORIDE 40 MG/1
2 TABLET ORAL
Qty: 8 | Refills: 0 | OUTPATIENT
Start: 2017-09-27 | End: 2017-09-29

## 2017-09-27 RX ORDER — GABAPENTIN 400 MG/1
1 CAPSULE ORAL
Qty: 21 | Refills: 0 | OUTPATIENT
Start: 2017-09-27 | End: 2017-10-04

## 2017-09-27 RX ADMIN — LIDOCAINE 1 PATCH: 4 CREAM TOPICAL at 12:07

## 2017-09-27 RX ADMIN — METHADONE HYDROCHLORIDE 42 MILLIGRAM(S): 40 TABLET ORAL at 19:32

## 2017-09-27 RX ADMIN — OXYCODONE HYDROCHLORIDE 10 MILLIGRAM(S): 5 TABLET ORAL at 17:00

## 2017-09-27 RX ADMIN — OXYCODONE HYDROCHLORIDE 5 MILLIGRAM(S): 5 TABLET ORAL at 19:22

## 2017-09-27 RX ADMIN — OXYCODONE HYDROCHLORIDE 10 MILLIGRAM(S): 5 TABLET ORAL at 13:05

## 2017-09-27 RX ADMIN — OXYCODONE HYDROCHLORIDE 10 MILLIGRAM(S): 5 TABLET ORAL at 02:15

## 2017-09-27 RX ADMIN — OXYCODONE HYDROCHLORIDE 10 MILLIGRAM(S): 5 TABLET ORAL at 07:32

## 2017-09-27 RX ADMIN — GABAPENTIN 300 MILLIGRAM(S): 400 CAPSULE ORAL at 06:47

## 2017-09-27 RX ADMIN — OXYCODONE HYDROCHLORIDE 10 MILLIGRAM(S): 5 TABLET ORAL at 16:19

## 2017-09-27 RX ADMIN — LIDOCAINE 1 PATCH: 4 CREAM TOPICAL at 00:30

## 2017-09-27 RX ADMIN — OXYCODONE HYDROCHLORIDE 5 MILLIGRAM(S): 5 TABLET ORAL at 18:03

## 2017-09-27 RX ADMIN — Medication 0.62 MILLIGRAM(S): at 12:05

## 2017-09-27 RX ADMIN — OXYCODONE HYDROCHLORIDE 10 MILLIGRAM(S): 5 TABLET ORAL at 12:05

## 2017-09-27 RX ADMIN — OXYCODONE HYDROCHLORIDE 10 MILLIGRAM(S): 5 TABLET ORAL at 01:45

## 2017-09-27 RX ADMIN — OXYCODONE HYDROCHLORIDE 10 MILLIGRAM(S): 5 TABLET ORAL at 07:02

## 2017-09-27 NOTE — PROGRESS NOTE ADULT - ASSESSMENT
A/P: 56yF s/p mechanical fall in bathtub now w/ L 6-9 rib fractures and trace PTX. Doing well.  - Traumatic pain, continue Pain control regimen w/ standing Tylenol, Gabapentin, and Lidoderm patch, PRN Oxycodone  - H/o narcotic dependency.  Cont Methadone  - OOB, Ambulate, IS (able to pull 1000cc in ED)  - Regular diet  - VTE ppx w/ Lovenox  - Noted to have small pulmonary nodules on Chest CT, will need repeat Chest CT in 12 months as patient is high risk given smoking history  - D/C today
A/P: 56yF s/p mechanical fall in bathtub now w/ L 6-9 rib fractures and trace PTX. Doing well.    - Cont Methadone  - Pain control w/ standing Tylenol, Gabapentin, and Lidoderm patch, PRN Oxycodone  - OOB, Ambulate, IS (able to pull 1000cc in ED)  - Regular diet  - VTE ppx w/ Lovenox  - Monitor respiratory status  - Cont home Premarin.  is bringing home dose fo Zapatier for HCV (2 weeks left in course)  - Noted to have small pulmonary nodules on Chest CT, will need repeat Chest CT in 12 months as patient is high risk given smoking history    Parminder Murrieta M.D.

## 2017-09-27 NOTE — DISCHARGE NOTE ADULT - CARE PLAN
Principal Discharge DX:	Closed fracture of multiple ribs of left side, initial encounter  Goal:	Ambulate, void, tolerate PO, control pain  Instructions for follow-up, activity and diet:	Please follow up with Dr. Schroeder within x1 week after discharge from the hospital. You may call [phone] to schedule an appointment.      - Noted to have small pulmonary nodules on Chest CT, will need repeat Chest CT in 12 months as outpatient, patient is high risk given smoking history

## 2017-09-27 NOTE — DISCHARGE NOTE ADULT - HOSPITAL COURSE
9/25: 56yF w/ PMH sig for HCV on Zepatier, s/p hysterectomy approximately 25 yrs ago. Pt slipped and fell in bathtub at approximately 2100 on 9/24/17. She landed on the left side of her chest. She states she felt like the wind was knocked out of her, but she was able to breath normally shortly after the fall. She experienced sharp stabbing pain in the left chest immediately after the fall that has improved slightly. She initially presented to [facility] that night. A CT chest at [facility] ED was sig for L 6-9 rib fractures w/ trace PTX. She was transferred to [facility] for further evaluation and management. Pt deneis LOC, HA, weakness, numbness, tingling, N/V, and neck, extremity, abdominal, or pelvic pain. She states it is difficult to take a deep breath secondary to pain, but denies SoB. She also states it is difficult to cough secondary to pain. (25 Sep 2017 07:31)  < from: Xray Chest 1 View AP- PORTABLE-Urgent (09.26.17 @ 07:14) >  IMPRESSION:   Clear lungs.     < end of copied text >    There were no complications. The patient had daily care.  Diet was advanced as tolerated. The patient's pain was controlled by IV pain medications and then by PO pain medications. The patient was placed back on home medications. At the time of discharge, the patient was hemodynamically stable, was tolerating PO diet, voiding, ambulating, and was comfortable with adequate pain control.   Pt discharged with the following instructions:   Please follow up with Dr. Schroeder within x1 week after discharge from the hospital. You may call [phone] to schedule an appointment.    - Please follow up with your primary care physician regarding your hospitalization. Please schedule an appointment with your primary care provider within two weeks after your discharge to review your hospital course.   - Noted to have small pulmonary nodules on Chest CT, will need repeat Chest CT in 12 months as outpatient, patient is high risk given smoking history 9/25: 56yF w/ PMH sig for HCV on Zepatier, s/p hysterectomy approximately 25 yrs ago. Pt slipped and fell in bathtub at approximately 2100 on 9/24/17. She landed on the left side of her chest. She states she felt like the wind was knocked out of her, but she was able to breath normally shortly after the fall. She experienced sharp stabbing pain in the left chest immediately after the fall that has improved slightly. She initially presented to [facility] that night. A CT chest at [facility] ED was sig for L 6-9 rib fractures w/ trace PTX. She was transferred to [facility] for further evaluation and management. Pt deneis LOC, HA, weakness, numbness, tingling, N/V, and neck, extremity, abdominal, or pelvic pain. She states it is difficult to take a deep breath secondary to pain, but denies SoB. She also states it is difficult to cough secondary to pain. (25 Sep 2017 07:31)  CXR: IMPRESSION:   Clear lungs.       There were no complications. The patient had daily care.  Diet was advanced as tolerated. The patient's pain was controlled by IV pain medications and then by PO pain medications. The patient was placed back on home medications. At the time of discharge, the patient was hemodynamically stable, was tolerating PO diet, voiding, ambulating, and was comfortable with adequate pain control.   Pt discharged with the following instructions:   Please follow up with Dr. Schroeder within x1 week after discharge from the hospital. You may call [phone] to schedule an appointment.    - Please follow up with your primary care physician regarding your hospitalization. Please schedule an appointment with your primary care provider within two weeks after your discharge to review your hospital course.   - Noted to have small pulmonary nodules on Chest CT, will need repeat Chest CT in 12 months as outpatient, patient is high risk given smoking history

## 2017-09-27 NOTE — DISCHARGE NOTE ADULT - PLAN OF CARE
Ambulate, void, tolerate PO, control pain Please follow up with Dr. Schroeder within x1 week after discharge from the hospital. You may call [phone] to schedule an appointment.      - Noted to have small pulmonary nodules on Chest CT, will need repeat Chest CT in 12 months as outpatient, patient is high risk given smoking history

## 2017-09-27 NOTE — DISCHARGE NOTE ADULT - MEDICATION SUMMARY - MEDICATIONS TO TAKE
I will START or STAY ON the medications listed below when I get home from the hospital:    acetaminophen 325 mg oral tablet  -- 2 tab(s) by mouth every 6 hours  -- Indication: For Pain    Oxaydo 5 mg oral tablet  -- 1 tab(s) by mouth every 6 hours, As Needed -Moderate Pain (4 - 6) MDD:6  -- Indication: For Pain    gabapentin 300 mg oral capsule  -- 1 cap(s) by mouth every 8 hours  -- Indication: For Anticonvulsant    Zepatier 50 mg-100 mg oral tablet  -- 1 tab(s) by mouth once a day  -- Indication: For Hepatitis C virus infection, unspecified chronicity    lidocaine 5% topical film  -- Apply on skin to affected area once a day, As Needed   -- Indication: For Pain    Premarin 0.625 mg oral tablet  -- 1 tab(s) by mouth once a day  -- Indication: For Hormone modifier I will START or STAY ON the medications listed below when I get home from the hospital:    Oxaydo 5 mg oral tablet  -- 1 tab(s) by mouth every 6 hours, As Needed -Moderate Pain (4 - 6) MDD:6  -- Indication: For Pain    methadone 10 mg oral tablet  -- 2 tab(s) by mouth 2 times a day MDD:4  -- Caution federal law prohibits the transfer of this drug to any person other  than the person for whom it was prescribed.  May cause drowsiness.  Alcohol may intensify this effect.  Use care when operating dangerous machinery.  This prescription cannot be refilled.  Using more of this medication than prescribed may cause serious breathing problems.    -- Indication: For Home med, must follow up w/ clinic    Zepatier 50 mg-100 mg oral tablet  -- 1 tab(s) by mouth once a day  -- Indication: For Hepatitis C virus infection, unspecified chronicity    Premarin 0.625 mg oral tablet  -- 1 tab(s) by mouth once a day  -- Indication: For Hormone modifier

## 2017-09-27 NOTE — DISCHARGE NOTE ADULT - ADDITIONAL INSTRUCTIONS
Please follow up with Dr. Schroeder within x1 week after discharge from the hospital. You may call [phone] to schedule an appointment.    - Please follow up with your primary care physician regarding your hospitalization. Please schedule an appointment with your primary care provider within two weeks after your discharge to review your hospital course.   - Noted to have small pulmonary nodules on Chest CT, will need repeat Chest CT in 12 months as outpatient, patient is high risk given smoking history

## 2017-09-27 NOTE — PHYSICAL THERAPY INITIAL EVALUATION ADULT - PERTINENT HX OF CURRENT PROBLEM, REHAB EVAL
Pt is a 56yF admitted to [facility] on 9/25/17 s/p slip and fall in bathtub on 9/24, landed on L side of chest. Pt felt like the wind was knocked out of her, able to breath normally shortly after the fall. +sharp stabbing pain in the L chest immediately after the fall. Presented to [facility], CT chest +L 6-9 rib fx with trace PTX. Transferred to [facility] for further eval. Pt w/ PMH sig for HCV on Zepatier, s/p hysterectomy approximately 25 yrs ago

## 2017-09-27 NOTE — PROGRESS NOTE ADULT - SUBJECTIVE AND OBJECTIVE BOX
ATP Progress Note    S: No acute events overnight. Patient resting comfortably in bed. Pain well controlled. Denies fevers/chills and N/V. Denies flatus or bowel movement.    O:  T(C): 36.9 (09-26-17 @ 08:50), Max: 37 (09-25-17 @ 21:07)  HR: 56 (09-26-17 @ 08:50) (56 - 110)  BP: 104/65 (09-26-17 @ 08:50) (99/70 - 123/71)  RR: 18 (09-26-17 @ 08:50) (18 - 18)  SpO2: 94% (09-26-17 @ 08:50) (94% - 99%)  Wt(kg): --    09-25 @ 07:01  -  09-26 @ 07:00  --------------------------------------------------------  IN:    Oral Fluid: 360 mL  Total IN: 360 mL    OUT:  Total OUT: 0 mL    Total NET: 360 mL        CBC Full  -  ( 25 Sep 2017 09:18 )  WBC Count : 8.8 K/uL  Hemoglobin : 13.9 g/dL  Hematocrit : 40.1 %  Platelet Count - Automated : 147 K/uL  Mean Cell Volume : 101.0 fl  Mean Cell Hemoglobin : 35.1 pg  Mean Cell Hemoglobin Concentration : 34.7 gm/dL        Physical Exam:  - Gen: WD/WN, NAD; A&Ox3  - HEENT: EOMI  - Resp: no accessory muscle use	  - Abd: Soft, ND, NT, No HSM, No rebound or guarding  - Ext: WWP. No clubbing, cyanosis, or edema
~ Phone conversation with Lopez Sal MD, [phone] ~    Above provider is the medical director for methadone clinic where Ms. Richards is a patient. Patient is ready for discharge today, but was unable to be seen by the methadone clinic today for her dose. She also has concerns that she will be in too much pain to travel to the clinic the next few days. I spoke with Dr. Sal who was amendable to the ACS team prescribing Ms. Richards methadone for 2 days, and she would be seen in the clinic this upcoming Saturday, 9/30. The patient normally takes 42mg PO solution. Dr. Sal suggested 20mg PO tablet bid x 2 days. [facility] does not carry 20mg tabs, and recommended 10mg tabs, 2 tabs, bid x 2 days. This was explained to the patient and she was okay with this plan.    Parminder Murrieta M.D.
ATP Progress Note    S: No acute events overnight. Patient resting comfortably in bed. Pain well controlled. Denies fevers/chills and N/V. Denies flatus or bowel movement.    O:  T(C): 36.9 (09-27-17 @ 06:29), Max: 36.9 (09-26-17 @ 08:50)  HR: 70 (09-27-17 @ 06:29) (56 - 72)  BP: 106/72 (09-27-17 @ 06:29) (103/68 - 132/80)  RR: 18 (09-27-17 @ 06:29) (18 - 18)  SpO2: 96% (09-27-17 @ 06:29) (94% - 96%)  Wt(kg): --    09-26 @ 07:01  -  09-27 @ 07:00  --------------------------------------------------------  IN:    Oral Fluid: 2040 mL  Total IN: 2040 mL    OUT:  Total OUT: 0 mL    Total NET: 2040 mL    Physical Exam:  - Gen: WD/WN, NAD; A&Ox3  - HEENT: EOMI  - Resp: no accessory muscle use	  - Abd: Soft, ND, NT, No HSM, No rebound or guarding  - Ext: WWP. No clubbing, cyanosis, or edema                          13.9   8.8   )-----------( 147      ( 25 Sep 2017 09:18 )             40.1     09-25    142  |  106  |  7   ----------------------------<  85  4.2   |  24  |  0.51    Ca    8.2<L>      25 Sep 2017 09:18    TPro  6.8  /  Alb  3.9  /  TBili  0.5  /  DBili  0.2  /  AST  34  /  ALT  24  /  AlkPhos  55  09-25

## 2017-09-27 NOTE — PHYSICAL THERAPY INITIAL EVALUATION ADULT - ADDITIONAL COMMENTS
Pt lives in apt with spouse, +20 steps with no railing to apt, +1-2 steps inside, PTA ind amb and ADLs, no device, spouse works, +3 cats and 1 dog

## 2017-09-27 NOTE — DISCHARGE NOTE ADULT - CARE PROVIDER_API CALL
Lenny Schroeder), Surgery; Surgical Critical Care  [address] [place] [id number]  Phone: [phone]  Fax: [phone]

## 2017-09-27 NOTE — DISCHARGE NOTE ADULT - PATIENT PORTAL LINK FT
“You can access the FollowHealth Patient Portal, offered by [facility], by registering with the following website: [email or web address]”

## 2017-09-29 RX ORDER — OXYCODONE HYDROCHLORIDE 5 MG/1
1 TABLET ORAL
Qty: 20 | Refills: 0 | OUTPATIENT
Start: 2017-09-29 | End: 2017-10-03

## 2017-10-09 RX ORDER — ELBASVIR AND GRAZOPREVIR 50; 100 MG/1; MG/1
1 TABLET, FILM COATED ORAL
Qty: 0 | Refills: 0 | COMMUNITY

## 2017-10-09 RX ORDER — ESTROGENS, CONJUGATED 0.625 MG
1 TABLET ORAL
Qty: 0 | Refills: 0 | COMMUNITY

## 2017-11-01 ENCOUNTER — APPOINTMENT (OUTPATIENT)
Age: 57
End: 2017-11-01

## 2021-03-21 NOTE — ED ADULT NURSE NOTE - MUSCULOSKELETAL WDL
COVID SPECIMEN OBTAINED AND SENT TO LAB. Full range of motion of upper and lower extremities, no joint tenderness/swelling.

## 2021-07-08 NOTE — PATIENT PROFILE ADULT. - FUNCTIONAL SCREEN CURRENT LEVEL: SWALLOWING (IF SCORE 2 OR MORE FOR ANY ITEM, CONSULT REHAB SERVICES), MLM)
(0) swallows foods/liquids without difficulty Breath Sounds equal & clear to percussion & auscultation, no accessory muscle use

## 2022-10-21 ENCOUNTER — EMERGENCY (EMERGENCY)
Facility: HOSPITAL | Age: 62
LOS: 1 days | Discharge: HOME | End: 2022-10-21
Attending: EMERGENCY MEDICINE | Admitting: EMERGENCY MEDICINE
Payer: MEDICAID

## 2022-10-21 VITALS
SYSTOLIC BLOOD PRESSURE: 171 MMHG | DIASTOLIC BLOOD PRESSURE: 85 MMHG | TEMPERATURE: 97 F | OXYGEN SATURATION: 98 % | HEART RATE: 88 BPM | WEIGHT: 130.07 LBS | RESPIRATION RATE: 17 BRPM | HEIGHT: 67 IN

## 2022-10-21 DIAGNOSIS — Z90.710 ACQUIRED ABSENCE OF BOTH CERVIX AND UTERUS: Chronic | ICD-10-CM

## 2022-10-21 PROCEDURE — 73630 X-RAY EXAM OF FOOT: CPT

## 2022-10-21 PROCEDURE — 99283 EMERGENCY DEPT VISIT LOW MDM: CPT

## 2022-10-21 PROCEDURE — 99283 EMERGENCY DEPT VISIT LOW MDM: CPT | Mod: 25

## 2022-10-21 PROCEDURE — 73630 X-RAY EXAM OF FOOT: CPT | Mod: 26,LT

## 2022-10-21 RX ORDER — IBUPROFEN 200 MG
600 TABLET ORAL ONCE
Refills: 0 | Status: COMPLETED | OUTPATIENT
Start: 2022-10-21 | End: 2022-10-21

## 2022-10-21 RX ADMIN — Medication 600 MILLIGRAM(S): at 19:41

## 2022-10-21 NOTE — ED PROVIDER NOTE - MUSCULOSKELETAL, MLM
FROM x 4. Left foot: no deformity, mild swelling and tenderness by 1st/2nd metatarsal, no ecchymosis, +NVI.

## 2022-10-21 NOTE — ED PROVIDER NOTE - CLINICAL SUMMARY MEDICAL DECISION MAKING FREE TEXT BOX
62 yo F with L foot pain plantar aspect of 1st/2nd MTP joints. States she was bent down and her feet was in a dorsiflexed position when she felt a sudden pain base of 1st MTP and 2nd toe followed by pain with weight bearing afterwards. Foot XR shows possible dislocation and nondisplaced fracture of the sesamoid bone. Ace wrap applied. Advised elevation and nsaids and podiatry follow up. No hx of gout.

## 2022-10-21 NOTE — ED PROVIDER NOTE - NS ED ATTENDING STATEMENT MOD
This was a shared visit with the JOSE FRANCISCO. I reviewed and verified the documentation and independently performed the documented:

## 2022-10-21 NOTE — ED PROVIDER NOTE - PATIENT PORTAL LINK FT
You can access the FollowMyHealth Patient Portal offered by [facility] by registering at the following website: [email or web address]. By joining [employer]’s FollowMyHealth portal, you will also be able to view your health information using other applications (apps) compatible with our system.

## 2022-10-21 NOTE — ED ADULT TRIAGE NOTE - NSWEIGHTCALCTOOLDRUG_GEN_A_CORE
18 g cores right hepatic mass under ct guidance (4 cores)    + touch prep acc to dr ferraro    biosentry tract sealant  used

## 2022-10-21 NOTE — ED PROVIDER NOTE - NSFOLLOWUPINSTRUCTIONS_ED_ALL_ED_FT
Ibuprofen, tylenol as needed for pain.  Rest. Elevate your foot. Ace bandage. Hard sole shoe.  Follow up with podiatrist.  Return for worsening or concerning symptoms.          Many things can cause foot pain. Some common causes are:  •An injury.      •A sprain.      •Arthritis.      •Blisters.      •Bunions.        Follow these instructions at home:      Managing pain, stiffness, and swelling   Bag of ice on a towel on the skin.   If directed, put ice on the painful area:  •Put ice in a plastic bag.      •Place a towel between your skin and the bag.      •Leave the ice on for 20 minutes, 2–3 times a day.      Activity     • Do not stand or walk for long periods.      •Return to your normal activities as told by your health care provider. Ask your health care provider what activities are safe for you.      •Do stretches to relieve foot pain and stiffness as told by your health care provider.      • Do not lift anything that is heavier than 10 lb (4.5 kg), or the limit that you are told, until your health care provider says that it is safe. Lifting a lot of weight can put added pressure on your feet.      Lifestyle     •Wear comfortable, supportive shoes that fit you well. Do not wear high heels.      •Keep your feet clean and dry.      General instructions     •Take over-the-counter and prescription medicines only as told by your health care provider.      •Rub your foot gently.      •Pay attention to any changes in your symptoms.      •Keep all follow-up visits as told by your health care provider. This is important.        Contact a health care provider if:    •Your pain does not get better after a few days of self-care.      •Your pain gets worse.      •You cannot stand on your foot.        Get help right away if:    •Your foot is numb or tingling.      •Your foot or toes are swollen.      •Your foot or toes turn white or blue.      •You have warmth and redness along your foot.        Summary    •Common causes of foot pain are injury, sprain, arthritis, blisters, or bunions.      •Ice, medicines, and comfortable shoes may help foot pain.      •Contact your health care provider if your pain does not get better after a few days of self-care.      This information is not intended to replace advice given to you by your health care provider. Make sure you discuss any questions you have with your health care provider.

## 2022-10-21 NOTE — ED PROVIDER NOTE - OBJECTIVE STATEMENT
61 F c/o left foot pain after putting foot down to stand. Applied ice. No medication taken. Throbbing pain. Denies hx gout or similar symptoms in the past.

## 2022-10-21 NOTE — ED PROVIDER NOTE - NSICDXFAMILYHX_GEN_ALL_CORE_FT
FAMILY HISTORY:  Sibling  Still living? No  Family history of hepatitis B, Age at diagnosis: Age Unknown

## 2022-10-21 NOTE — ED PROVIDER NOTE - NSICDXPASTMEDICALHX_GEN_ALL_CORE_FT
PAST MEDICAL HISTORY:  Back ache     Closed fracture of multiple ribs of left side, initial encounter     Hepatitis C virus infection, unspecified chronicity

## 2022-10-21 NOTE — ED PROVIDER NOTE - CARE PROVIDER_API CALL
Minh Owens (DPM)  Foot Surgery; Podiatric Medicine; Recon RearfootAnkle Surgery  [address] [place] [address]  Phone: [phone]  Fax: [phone]  Follow Up Time:

## 2022-10-22 PROBLEM — B19.20 UNSPECIFIED VIRAL HEPATITIS C WITHOUT HEPATIC COMA: Chronic | Status: ACTIVE | Noted: 2017-09-25

## 2022-10-22 PROBLEM — S22.42XA MULTIPLE FRACTURES OF RIBS, LEFT SIDE, INITIAL ENCOUNTER FOR CLOSED FRACTURE: Chronic | Status: ACTIVE | Noted: 2017-09-25

## 2022-10-22 RX ORDER — LIDOCAINE 4 G/100G
1 CREAM TOPICAL
Qty: 15 | Refills: 0
Start: 2022-10-22 | End: 2022-10-26

## 2022-10-22 RX ORDER — IBUPROFEN 200 MG
1 TABLET ORAL
Qty: 20 | Refills: 0
Start: 2022-10-22 | End: 2022-10-26

## 2023-04-13 NOTE — ED ADULT NURSE NOTE - OBJECTIVE STATEMENT
patient status post fall yesterday, falling on the face and now complaints of generalized body aches and bruise to right pelvis Intermediate Repair And Graft Additional Text (Will Appearing After The Standard Complex Repair Text): The intermediate repair was not sufficient to completely close the primary defect. The remaining additional defect was repaired with the graft mentioned below.

## 2023-06-12 NOTE — ED ADULT TRIAGE NOTE - CHIEF COMPLAINT QUOTE
Tripped and fell yesterday hit nose right elbow and right hip Olanzapine Counseling- I discussed with the patient the common side effects of olanzapine including but are not limited to: lack of energy, dry mouth, increased appetite, sleepiness, tremor, constipation, dizziness, changes in behavior, or restlessness.  Explained that teenagers are more likely to experience headaches, abdominal pain, pain in the arms or legs, tiredness, and sleepiness.  Serious side effects include but are not limited: increased risk of death in elderly patients who are confused, have memory loss, or dementia-related psychosis; hyperglycemia; increased cholesterol and triglycerides; and weight gain.

## 2023-07-18 NOTE — ED ADULT NURSE NOTE - FALL CAUSE
AUTHORIZATION FOR RELEASE OF   CONFIDENTIAL INFORMATION    [facility] Medical Records Department,    We are seeing Komal Mae, date of birth 1963, in the clinic at [facility]. Brian Baker MD is the patient's PCP. Komal Mae has an outstanding lab/procedure at the time we reviewed her chart. In order to help keep her health information updated, she has authorized us to request the following medical record(s):                             MAMMOGRAM    [id number]                                              Please fax records to [facility], Paul G Matherne, [place], [phone].     If you have any questions, please contact:    Magui Daley LPN Care Coordinator  [facility]  Phone: [phone]  FAX: [phone]           Patient Name: Komal Mae  : 1963  Patient Phone #: [phone]      slipping, stumbling. tripping

## 2023-11-30 NOTE — ED ADULT NURSE NOTE - SKIN TEMPERATURE MOISTURE
PT would like to know if Dr. Sotelo would like him to have any testing done.    PT requesting call back.    Joshua  [phone]   warm